# Patient Record
(demographics unavailable — no encounter records)

---

## 2019-06-06 NOTE — NUR
PT PRESENTS TO ED WITH C/O SOB, COUGH X3 WEEKS. PT ALSO REPORTS LETHARGY. 
STATED HEMOGLOBIN DROP FROM 10.6(01/2019) TO 7.0(TODAY. AAO X4, GCS 15, 
AMBULATORY WITH STEADY GAIT. RESPIRATIONS EVEN AND UNLABORED, BL LUNG CLEAR. 
SKIN WARM/PALE/DRY, +PMSC. ABDOMEN SOFT, NON DISTENDED, ACTIVE BOWEL SOUND X4. 
VS WNL, DENIES PAIN AT THIS TIME. WILL CONTIONUE TO MONITOR

## 2019-06-06 NOTE — NUR
PATIENT ADMITTED TO ROOM 116, TELE FLOOR. UNDER THE CARE OF DR. MADRIGAL. REPORT 
GIVEN TO CHRISSY MARTIN. PATIENT STABLE DURING TRANSFER.

## 2019-06-06 NOTE — NUR
REPORT RECEIVED FROM ED NURSE AT BEDSIDE. PT IN STABLE CONDITION. AAOX4. INTRODUCED TO PT. 
BOARD UPDATED. NO COMPLAINTS OF PAIN. NO SOB. AFEBRILE. PT IS AMBULATORY. VIP. IV SITE L AC 
20G PATENT AND INTACT. SKIN WARM, DRY, AND INTACT WITH NO OPEN WOUNDS. MRSA NARES COLLECTED. 
PT SKIN COLOR WNL TO ETHNICITY. BED LOCKED IN LOW POSITION. CALL BELL WITHIN REACH. SAFETY 
PRECAUTION IN PLACE. ALL NEEDS MET AT THIS TIME.

## 2019-06-07 NOTE — NUR
RECEIVED CRITICAL VALUE FOR HGB 6.7 AND HCT 21, REPORTED TO DR ESCALANTE. PATIENT IS RECEIVING 
BLOOD TRANSFUSION AT THIS TIME. NO SIGNS OF DISTRESS NOTED. SAFETY MEASURES IN PLACE.

## 2019-06-07 NOTE — NUR
PATIENT IS TALKING TO VISITOR AT BEDSIDE. NO SIGNS OF DISTRESS NOTED. SAFETY MEASURES IN 
PLACE. TELE MONITOR ATTACHED.

## 2019-06-07 NOTE — NUR
WENT TO BLOOD BANK AT 0610 TO  BLOOD. STARTED BLOOD TRANSFUSION. EDUCATED PATIENT 
REGARDING SIGNS AND SYMPTOMS OF AN ALLERGIC REACTION. INSTRUCTED PATIENT TO REPORT ANY 
ALLERGIC REACTION FROM THE TRANSFUSION. INITIAL VS /81, P 82, T 98.1, RR 19. WILL 
CONTINUE TO MONITOR PATIENT.

## 2019-06-07 NOTE — NUR
PATIENT LYING DOWN IN BED, WATCHING TV. INSTRUCTED TO USE CALL LIGHT WHEN NEEDED. WILL 
CONTINUE TO MONITOR PATIENT.

## 2019-06-07 NOTE — NUR
RECEIVED BEDSIDE REPORT FROM NIGHT SHIFT NURSE. PATIENT IS AWAKE AND RESTING ON BED. PATIENT 
IS RECEIVING BLOOD TRANSFUSION AT THIS TIME. NO ALLERGIC REACTION NOTED. PATIENT IS AAOX4. 
DENIES PAIN AND SOB AT THIS TIME. RESPIRATION EVEN AND UNLABORED ON RA. NO SIGNS OF DISTRESS 
NOTED. IV ON LAC 20G, CLEAN AND DRY, RECEIVING BLOOD TRANSFUSION. SKIN INTACT AND CLEAN. 
PATIENT IS CONTINENT AND ABLE TO AMBULATE WITH STANDBY ASSIST. DISCUSSED PLAN OF CARE WITH 
PATIENT AND PATIENT VERBALIZED UNDERSTANDING.  INSTRUCTED PATIENT TO USE THE CALL LIGHT FOR 
ANY ASSISTANCE AND PATIENT WAS AWARE. BED IN LOW POSITION AND CALL LIGHT WITHIN REACH.

## 2019-06-07 NOTE — NUR
PATIENT IS SITTING UP ON BED AND TALKING TO  AT BEDSIDE. NO SIGNS OF DISTRESS NOTED. 
SAFETY MEASURES IN PLACE. TELE MONITOR IN PLACE. BED IN LOW POSITION AND CALL LIGHT WITHIN 
REACH.

## 2019-06-07 NOTE — NUR
ENDORSED PATIENT TO NIGHT SHIFT NURSE FOR CONTINUITY OF CARE. PATIENT IS IN A STABLE 
CONDITION. INFORMED NIGHT SHIFT NURSE THAT PATIENT NEEDS TO DRINK 2L OF WATER AND PREPARING  
FOR COLONOSCOPY AND EDG TOMORROW. PATIENT HAS TO BE NPO AFTER MIDNIGHT. NIGHT SHIFT NURSE 
WAS AWARE.

## 2019-06-07 NOTE — NUR
ADMINISTERED MEDS AS PER MD ORDER, PATIENT TOLERATED WELL. PROVIDED 2 LITER OF WATER IN 
WATER JAR. INSTRUCTED PATIENT TO DRINK 2 LITER OF WATER. PATIENT WAS AWARE AND SAID ," OK, I 
WILL TAKE MY TIME AND MAKE SURE I FINISH THEM BY EVENING." SAFETY MEASURES IN PLACE. TELE 
MONITOR ATTACHED.

## 2019-06-07 NOTE — NUR
PATIENT HAS BEEN SCREENED AND CATEGORIZED AS MODERATE NUTRITION RISK. PATIENT WILL BE SEEN 
WITHIN 3-5 DAYS OF ADMISSION.



6/9/19-6/11/19



[address]

## 2019-06-07 NOTE — NUR
PATIENT COMPLAINED OF FEELING NAUSEA AFTER DRINKING TOO MUCH WATER. ADMINISTERED PRN ZOFRAN, 
PATIENT TOLERATED WELL. INSTRUCTED PATIENT TO SPACE OUT THE WATER AS TOLERATED. PATIENT SAID 
OK. PATIENT IS TALKING TO VISITOR MICK AT BEDSIDE. SAFETY MEASURES IN PLACE. TELE MONITOR 
ATTACHED.

## 2019-06-07 NOTE — NUR
RECEIVED TELEPHONE ORDER FROM DR ESCOBEDO THAT HE WILL COME IN AROUND 1400 DARREL, AND HE 
WOULD LIKE TO PUT IN THE FOLLOWING ORDERS: CHANGE DIET FROM REGULAR TO CLEAR LIQUID, MAG 
CITRATE 300 ML AND DULCOLAX 5MG X3 TABS AT 1300 AND 1700 FOLLOW BY 2 LITERS OF WATER/7-UP 
AFTER MEDS ADMINISTRATION. READ BACK ORDER AND CONFIRMED ORDER WITH DR ESCOBEDO.

## 2019-06-07 NOTE — NUR
PATIENT IS AWAKE ON BED. DENIES PAIN AND SOB. NO SIGNS OF DISTRESS NOTED. GRANDDAUGHTER RIDA 
IS BY BEDSIDE. SAFETY MEASURES IN PLACE. TELE MONITOR ATTACHED. BED IN LOW POSITION AND CALL 
LIGHT WITHIN REACH. BED ALARM ACTIVATED. 

-------------------------------------------------------------------------------

Addendum: 06/07/19 at 1517 by Eliza Peter RN

-------------------------------------------------------------------------------

WRONG PATIENT

## 2019-06-07 NOTE — NUR
DR ZHANG IS ASSESSING PATIENT AT BEDSIDE. NO SIGNS OF DISTRESS NOTED. TELE MONITOR ATTACHED. 
BED IN LOW POSITION AND CALL LIGHT WITHIN REACH.

## 2019-06-07 NOTE — NUR
ASSISTED PATIENT TO USE THE BATHROOM AND GO BACK ON BED. NO SIGNS OF DISTRESS NOTED. PATIENT 
DENIED PAIN AND SOB. TELE MONITOR ATTACHED. SAFETY MEASURES IN PLACE.

## 2019-06-07 NOTE — NUR
OBTAINED CONSENTS FOR COLONOSCOPY AND EDG. DR ESCOBEDO HAS EXPLAINED TO PATIENT ABOUT THE 
RISKS AND BENEFITS. PATIENT WAS AWARE AND AGREED FOR THE PROCEDURES.

## 2019-06-07 NOTE — NUR
PATIENT SITTING UP IN BED, WATCHING TV. GAVE INSTRUCTIONS FOR URINE AND STOOL SPECIMEN 
COLLECTION. WILL CONTINUE TO MONITOR AND UPDATE PATIENT REGARDING BLOOD TRANSFUSION ORDER.

## 2019-06-07 NOTE — NUR
PATIENT LYING IN BED, APPEARS TO BE SLEEPING. WOKE UP FOR VITAL SIGNS CHECK. USED THE 
RESTROOM TO URINATE AND ABLE TO COLLECT SAMPLE FOR URINE LAB TEST. URINE SPECIMEN TAKEN TO 
THE LAB BY JAMISON.

## 2019-06-07 NOTE — NUR
DR ESCOBEDO IS TALKING TO PATIENT AT BEDSIDE. NO SIGNS OF DISTRESS NOTED. SAFETY MEASURES 
IN PLACE. TELE MONITOR ATTACHED.

## 2019-06-07 NOTE — NUR
PATIENT IS AWAKE AND SITTING UP ON BED. DENIES PAIN AND SOB. NO SIGNS OF DISTRESS NOTED. 
TELE MONITOR ATTACHED. BED IN LOW POSITION AND CALL LIGHT WITHIN REACH.

## 2019-06-07 NOTE — NUR
ADMINISTERED MEDS AS PER MD ORDER, PATIENT IS AWAKE AND SITTING UP ON EDGE OF BED. SHE IS 
EATING LUNCH. DENIES DIZZINESS, PAIN AND SOB. NO ADVERSE REACTION NOTED. TELE MONITOR 
ATTACHED. SAFETY MEASURES IN PLACE.

## 2019-06-07 NOTE — NUR
BLOOD TRANSFUSION ENDED AT 0945, VITALS TAKEN AFTERWARD, NO ADVERSE REACTION NOTED. PATIENT 
DENIED SOB, DIZZINESS AND ITCH. PATIENT IS RESTING ON BED COMFORTABLY AT THIS TIME. SAFETY 
MEASURES IN PLACE. NOTIFIED DR ESCALANTE THAT BLOOD TRANSFUSION HAS COMPLETED. PER DR ESCALANTE, 
SHE WILL ORDER CBC.

## 2019-06-07 NOTE — NUR
RECEIVED BEDSIDE REPORT FROM DAY SHIFT NURSE. PATIENT IS AWAKE AND RESTING ON BED. PATIENT 
IS AAOX4. DENIES PAIN AND SOB AT THIS TIME. RESPIRATION EVEN AND UNLABORED ON ROOM AIR. NO 
SIGNS OF DISTRESS NOTED. IV ON LAC 20G, INTACT, PATENT, AND ASYMPTOMATIC. SKIN INTACT, WARM 
AND DRY TO TOUCH. PATIENT IS CONTINENT AND ABLE TO AMBULATE WITH STANDBY ASSIST. DISCUSSED 
PLAN OF CARE WITH PATIENT AND PATIENT VERBALIZED UNDERSTANDING.  INSTRUCTED PATIENT TO USE 
THE CALL LIGHT FOR ANY ASSISTANCE AND PATIENT WAS AWARE. BED IN LOW POSITION AND CALL LIGHT 
WITHIN REACH.

## 2019-06-07 NOTE — NUR
ADMINISTERED MEDS AS PER MD ORDER, PATIENT TOLERATED WELL. PATIENT SAID, " I FEEL BETTER 
FROM THE NAUSEA AFTER MEDICINE." PATIENT IS RESTING ON BED AND WATCHING TV AT THIS TIME. 
DENIES PAIN AND SOB. NO SIGNS OF DISTRESS NOTED. SAFETY MEASURES IN PLACE. TELE MONITOR 
ATTACHED.

## 2019-06-07 NOTE — NUR
PATIENT TOLERATING BLOOD TRANSFUSION WELL. VS ARE: /61, P 80, RR 17, T 98.3. PATIENT 
IS ON CONTINUOUS MONITORING DURING BLOOD TRANSFUSION.

## 2019-06-07 NOTE — NUR
ADMINISTERED MEDS AS PER MD ORDER, PATIENT TOLERATED WELL. PATIENT IS AWAKE AND EATING 
BREAKFAST. BLOOD TRANSFUSION IS RUNNING AT 100ML/HR. NO ALLERGIC REACTIONS NOTED. MINIMAL 
COUGHS NOTED AND WHEEZE HEARD ON EXHALE. PATIENT DENIED PAIN AND SOB. ON RA. NO SIGNS OF 
DISTRESS NOTED. INSTRUCTED PATIENT TO USE THE CALL LIGHT FOR ANY ASSISTANCE AND PATIENT WAS 
AWARE.

## 2019-06-08 NOTE — NUR
DR. CRUM IS TALKING TO PT NOW AND GIVING TEACHING AND MEDICATION INSTRUCTIONS TO PT, PT 
VERBALIZED UNDERSTANDING.

## 2019-06-08 NOTE — NUR
PT SLEEPING IN BED COMFORTABLY. NO SOB NOTED. NO ACUTE DISTRESS NOTED. BED IN LOW POSITION. 
CALL LIGHT WITHIN REACH.

## 2019-06-08 NOTE — NUR
PT IS AWAKE AND LYING ON THE BED WITH SIDE RAILS UP AND CALL LIGHT WITHIN REACH, FAMILY ON 
THE BEDSIDE, PT IS CONVERSING WITH FAMILY, ORAL AND SUBQ MEDICATIONS WERE GIVEN,PARAMETERS 
CHECKED AND PT TOLERATED IT. NO SIGN OF DISTRESS NOTED, WILL RE-ASSESS PAIN AND MONITOR PT.

## 2019-06-08 NOTE — NUR
ACCORDING TO PT, PT HAD 10 TIMES OF BM AND IT WAS CLEAR AND WATERY AT THE LAST TIME. ALLOW 
PT TO HAVE ONLY ICE CHIPS. PT UNDERSTAND BY VERBALIZED.

## 2019-06-08 NOTE — NUR
DISCHARGED PT VIA WHEELCHAIR ACCOMPANIED BY , IV LINE AND ARM BANDS REMOVED, 
TEACHINGS AND MEDICATION INSTRUCTIONS WERE GIVEN TO PT AND VERBALIZED UNDERSTANDING. VITAL 
SIGNS TAKEN AND CHARTED. PT IS STABLE AT THIS TIME.

## 2019-06-08 NOTE — NUR
RECEIVED PT FROM NIGHT SHIFT NURSE, PT IS AWAKE AND LYING ON THE BED WITH SIDE RAILS UP AND 
CALL LIGHT WITHIN REACH, PT HAS AN IV LINE ON THE LEFT AC G.20 WITH NS AT KVO, INTACT. PT IS 
SCHEDULE FOR A COLONOSCOPY THIS AM, CHECKLIST VERIFIED AND WAS DONE BY NIGHT SHIFT NURSE. NO 
SIGN OF DISTRESS NOTED AND WILL MONITOR PT.

## 2019-06-30 NOTE — NUR
RECEIVED REPORT FROM ED NURSE. PT BROUGHT TO UNIT VIA W/C, TRANSFERRED TO [facility] BED WITH 
STEADY GAIT.  AT BEDSIDE. AAOX4, COOPERATIVE. RESPIRATIONS EVEN AND UNLABORED ON RA. 
IV ON RT AC 20 GA RUNNING IVF PER ORDER, DRESSING CLEAN, DRY AND INTACT. LBM 6/30, BOWEL 
SOUNDS ACTIVE. SKIN IS INTACT, WARM TO TOUCH, COLOR IS APPROPRIATE TO ETHNICITY. NO EDEMA 
NOTED TO EXTREMITIES. EXPLAINED AND REVIEWED POC WITH PT AND , PT VERBALIZED 
UNDERSTANDING. CALL LIGHT WITHIN REACH. WILL CONTINUE TO MONITOR.

## 2019-06-30 NOTE — NUR
VITALS TAKEN. TOLERATED WELL. NO SIGNS OF DISTRESS. ROOM AIR. VITALS WITHIN NORMAL LIMITS. 
WILL CONTINUE TO MONITOR. 

-------------------------------------------------------------------------------

Addendum: 07/01/19 at 0312 by Yuliya Stovall RN

-------------------------------------------------------------------------------

INCORRECT DATE. 7/1/19 @ 0000

## 2019-06-30 NOTE — NUR
RECEIVED BEDSIDE REPORT FROM [facility] CHRISSY SILVA. PT A/0 X4. Persian SPEAKING. DISCUSSED PLAN 
OF CARE. VERBALIZED UNDERSTANDING. ABLE TO MAKE NEEDS KNOWN.  AT BEDSIDE. STANDARD 
PRECAUTIONS. NO SIGNS OF RESP DISTRESS. ROOM AIR. SKIN IS INTACT. R AC 20 G SALINE LOCK. IV 
SITE PATENT AND INTACT. AMBULATORY. STEADY GAIT. CONTINENT. BED IN LOW POSITION. CALL LIGHT 
WITHIN REACH. WILL CONTINUE TO MONITOR.

-------------------------------------------------------------------------------

Addendum: 07/01/19 at 0250 by Yuliya Stovall RN

-------------------------------------------------------------------------------

RECEIVED BEDSIDE REPORT FROM [facility] CHRISSY MELO. PT A/0 X4. ENGLISH SPEAKING. DISCUSSED PLAN 
OF CARE. VERBALIZED UNDERSTANDING. ABLE TO MAKE NEEDS KNOWN. STANDARD PRECAUTIONS. NO SIGNS 
OF RESP DISTRESS. ROOM AIR. SKIN IS INTACT. R AC 20 G INFUSING NS @60. IV SITE PATENT AND 
INTACT. AMBULATORY. STEADY GAIT. CONTINENT. BED IN LOW POSITION. CALL LIGHT WITHIN REACH. 
WILL CONTINUE TO MONITOR.

## 2019-06-30 NOTE — NUR
PT PRESENTS TO ED WITH C/O PRODUCTIVE COUGH X 3 DAYS. DENIES N/V/D. +BODYACHES, 
+FEVER; AFEBRILE AT THIS TIME. PT VERBALIZED FEELING SOB; RR EVEN AND 
UNLABORED, O2 SAT 98% ON RA. PT STATES GENERALIZED PAIN IS 7/10 AT THIS TIME. 
ERMD TO EVALUATE PT.

## 2019-06-30 NOTE — NUR
Patient is resting comfortably in bed. Vital Signs within normal limits. 
Respirations even and unlabored. denies any pain at this time. family member at 
bedside. No new orders at this time.

## 2019-06-30 NOTE — NUR
Patient will be admitted to care of Dr. Torrez. Admited to Med-surg room 116. 
Belongings list completed.

## 2019-07-01 NOTE — NUR
DISCHARGED PT TO HOME VIA WHEELCHAIR ACCOMPANIED BY COOPER SWIFT, TEACHINGS AND INSTRUCTIONS 
GIVEN TO PT AND VERBALIZED UNDERSTANDING. IV AND ARM BANDS REMOVED AND PT IS STABLE AT THIS 
TIME.

## 2019-07-01 NOTE — NUR
PT SLEEPING IN BED COMFORTABLY. NO SIGNS OF RESP DISTRESS. EVEN CHEST RISE. ROOM AIR. WILL 
CONTINUE TO MONITOR.

## 2019-07-01 NOTE — NUR
WILL ENDORSE PT TO [facility]. PT IN STABLE CONDITION. BED IN LOW POSITION. CALL 
LIGHT WITHIN REACH. WILL CONTINUE TO MONITOR.

## 2019-07-01 NOTE — NUR
OK to refill    Tai Nation MD VITALS TAKEN. TOLERATED WELL. NO SIGNS OF DISTRESS. ROOM AIR. VITALS WITHIN NORMAL LIMITS. 
WILL CONTINUE TO MONITOR.

## 2019-07-01 NOTE — NUR
PT IS AWAKE AND LYING ON THE BED VITAL SIGNS TAKEN AND BP /50, PULSE IS 80, O2 
SATURATION IS 94% AT ROOM AIR AND TEMPERATURE IS 98.1, PT DENIES SOB AND NO SIGN OF DISTRESS 
NOTED. WILL MONITOR PT.

## 2019-07-01 NOTE — NUR
PT IS AWAKE IN BED RESTING. STATES SHE IS UNABLE TO SLEEP TOO WELL THROUGH THE NIGHT. WILL 
CONTINUE TO MONITOR.

## 2019-07-01 NOTE — NUR
PT AWAKE IN BED WATCHING TV. REQUESTED TOOTHBRUSH AND TOOTH PASTE AND GOWN. BED IN LOW 
POSITION. CALL LIGHT WITHIN REACH. WILL CONTINUE TO MONITOR.

## 2019-07-01 NOTE — NUR
PT IS AWAKE AND LYING ON THE BED, V/S CHECKED AND BP /62, PULSE IS 98 AND O2 
SATURATION IS 94%, BP MEDICATIONS WERE NOT GIVEN BUT THE OTHER AM MEDICATIONS WERE TAKEN BY 
PT.AS PER BP RESULT. WILL CONTINUE TO MONITOR PT.

## 2019-07-01 NOTE — NUR
RECEIVED PT FROM NIGHT SHIFT NURSE, PT IS AWAKE AND SEATED ON THE BED WITH SIDE RAILS UP AND 
CALL LIGHT WITHIN REACH, PT HAS AN IV LINE ON THE LEFT HAND G.22 WITH NS INFUSING AT 
60ML/HR, INTACT, PT DENIES SOB AND NO SIGN OF DISTRESS NOTED . WILL CONTINUE TO MONITOR PT.

## 2020-08-28 NOTE — NUR
65 YEAR OLD FEMALE COMPLAINS OF LEFT SIDED FLANK PAIN THAT RADIATES TO THE LEFT 
LOWER ABDOMEN SINCE 4AM. PT STATES SHE HAS NAUSEA, BUT NOT VOMITTED. PT DENIES 
PROBLEMS WITH URINATION OR DEFECATION. PT AOX4, BREATHING EVEN AND UNLABORED, 
SKIN WARM AND DRY. BED IN LOWEST POSITION, LOCKED, BED RAIL UPX1.



PMH - HTN, DM2, COPD, ASTHMA, HIATAL HERNIA, 

ALLERGIES - PCN

## 2020-08-28 NOTE — NUR
PT ALERT AND AWAKE, BREATHING EVEN AND UNLABORED. NO DISTRESS NOTED. STATES HER 
PAIN IS DOING FINE AND NO NAUSEA

## 2020-08-28 NOTE — NUR
Patient discharged with v/s stable. Written and verbal after care instructions 
about urinary tract infection, hematuria, flank pain given and explained. 

Patient alert, oriented and verbalized understanding of instructions. 
Ambulatory with steady gait. All questions addressed prior to discharge. ID 
band removed. Patient advised to follow up with PMD. Rx of zofran, keflex, and 
lidoderm given. Patient educated on indication of medication including possible 
reaction and side effects. Opportunity to ask questions provided and answered.